# Patient Record
Sex: FEMALE | Race: WHITE | URBAN - METROPOLITAN AREA
[De-identification: names, ages, dates, MRNs, and addresses within clinical notes are randomized per-mention and may not be internally consistent; named-entity substitution may affect disease eponyms.]

---

## 2021-03-03 DIAGNOSIS — Z23 NEED FOR VACCINATION: ICD-10-CM

## 2024-08-29 ENCOUNTER — HOSPITAL ENCOUNTER (EMERGENCY)
Facility: MEDICAL CENTER | Age: 74
End: 2024-08-29
Attending: EMERGENCY MEDICINE

## 2024-08-29 VITALS
OXYGEN SATURATION: 95 % | HEIGHT: 63 IN | TEMPERATURE: 98.8 F | RESPIRATION RATE: 15 BRPM | WEIGHT: 225 LBS | HEART RATE: 98 BPM | DIASTOLIC BLOOD PRESSURE: 74 MMHG | BODY MASS INDEX: 39.87 KG/M2 | SYSTOLIC BLOOD PRESSURE: 166 MMHG

## 2024-08-29 DIAGNOSIS — E86.0 DEHYDRATION: ICD-10-CM
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
## 2024-08-29 LAB
ALBUMIN SERPL BCP-MCNC: 3.7 G/DL (ref 3.2–4.9)
ALBUMIN/GLOB SERPL: 1.1 G/DL
ALP SERPL-CCNC: 87 U/L (ref 30–99)
ALT SERPL-CCNC: 36 U/L (ref 2–50)
ANION GAP SERPL CALC-SCNC: 20 MMOL/L (ref 7–16)
APPEARANCE UR: CLEAR
AST SERPL-CCNC: 52 U/L (ref 12–45)
BACTERIA #/AREA URNS HPF: NEGATIVE /HPF
BASOPHILS # BLD AUTO: 0.4 % (ref 0–1.8)
BASOPHILS # BLD: 0.03 K/UL (ref 0–0.12)
BILIRUB SERPL-MCNC: 1.2 MG/DL (ref 0.1–1.5)
BILIRUB UR QL STRIP.AUTO: NEGATIVE
BUN SERPL-MCNC: 31 MG/DL (ref 8–22)
CALCIUM ALBUM COR SERPL-MCNC: 9.5 MG/DL (ref 8.5–10.5)
CALCIUM SERPL-MCNC: 9.3 MG/DL (ref 8.5–10.5)
CAOX CRY #/AREA URNS HPF: NORMAL /HPF
CHLORIDE SERPL-SCNC: 106 MMOL/L (ref 96–112)
CK SERPL-CCNC: 297 U/L (ref 0–154)
CO2 SERPL-SCNC: 14 MMOL/L (ref 20–33)
COLOR UR: YELLOW
CREAT SERPL-MCNC: 0.88 MG/DL (ref 0.5–1.4)
EOSINOPHIL # BLD AUTO: 0.03 K/UL (ref 0–0.51)
EOSINOPHIL NFR BLD: 0.4 % (ref 0–6.9)
EPI CELLS #/AREA URNS HPF: NEGATIVE /HPF
ERYTHROCYTE [DISTWIDTH] IN BLOOD BY AUTOMATED COUNT: 43.5 FL (ref 35.9–50)
GFR SERPLBLD CREATININE-BSD FMLA CKD-EPI: 69 ML/MIN/1.73 M 2
GLOBULIN SER CALC-MCNC: 3.5 G/DL (ref 1.9–3.5)
GLUCOSE SERPL-MCNC: 108 MG/DL (ref 65–99)
GLUCOSE UR STRIP.AUTO-MCNC: NEGATIVE MG/DL
HCT VFR BLD AUTO: 37.6 % (ref 37–47)
HGB BLD-MCNC: 12.3 G/DL (ref 12–16)
HYALINE CASTS #/AREA URNS LPF: NORMAL /LPF
IMM GRANULOCYTES # BLD AUTO: 0.02 K/UL (ref 0–0.11)
IMM GRANULOCYTES NFR BLD AUTO: 0.3 % (ref 0–0.9)
KETONES UR STRIP.AUTO-MCNC: >=160 MG/DL
LEUKOCYTE ESTERASE UR QL STRIP.AUTO: NEGATIVE
LYMPHOCYTES # BLD AUTO: 0.85 K/UL (ref 1–4.8)
LYMPHOCYTES NFR BLD: 11.4 % (ref 22–41)
MAGNESIUM SERPL-MCNC: 2 MG/DL (ref 1.5–2.5)
MCH RBC QN AUTO: 28.4 PG (ref 27–33)
MCHC RBC AUTO-ENTMCNC: 32.7 G/DL (ref 32.2–35.5)
MCV RBC AUTO: 86.8 FL (ref 81.4–97.8)
MICRO URNS: ABNORMAL
MONOCYTES # BLD AUTO: 1.08 K/UL (ref 0–0.85)
MONOCYTES NFR BLD AUTO: 14.5 % (ref 0–13.4)
MUCOUS THREADS #/AREA URNS HPF: NORMAL /HPF
NEUTROPHILS # BLD AUTO: 5.43 K/UL (ref 1.82–7.42)
NEUTROPHILS NFR BLD: 73 % (ref 44–72)
NITRITE UR QL STRIP.AUTO: NEGATIVE
NRBC # BLD AUTO: 0 K/UL
NRBC BLD-RTO: 0 /100 WBC (ref 0–0.2)
PH UR STRIP.AUTO: 5.5 [PH] (ref 5–8)
PLATELET # BLD AUTO: 267 K/UL (ref 164–446)
PMV BLD AUTO: 11.3 FL (ref 9–12.9)
POTASSIUM SERPL-SCNC: 3.8 MMOL/L (ref 3.6–5.5)
PROT SERPL-MCNC: 7.2 G/DL (ref 6–8.2)
PROT UR QL STRIP: 30 MG/DL
RBC # BLD AUTO: 4.33 M/UL (ref 4.2–5.4)
RBC # URNS HPF: NORMAL /HPF
RBC UR QL AUTO: NEGATIVE
SODIUM SERPL-SCNC: 140 MMOL/L (ref 135–145)
SP GR UR STRIP.AUTO: 1.03
TSH SERPL-ACNC: 2.41 UIU/ML (ref 0.35–5.5)
UROBILINOGEN UR STRIP.AUTO-MCNC: 1 MG/DL
WBC # BLD AUTO: 7.4 K/UL (ref 4.8–10.8)
WBC #/AREA URNS HPF: NORMAL /HPF

## 2024-08-29 PROCEDURE — 80053 COMPREHEN METABOLIC PANEL: CPT

## 2024-08-29 PROCEDURE — 700105 HCHG RX REV CODE 258: Performed by: EMERGENCY MEDICINE

## 2024-08-29 PROCEDURE — 36415 COLL VENOUS BLD VENIPUNCTURE: CPT

## 2024-08-29 PROCEDURE — 84443 ASSAY THYROID STIM HORMONE: CPT

## 2024-08-29 PROCEDURE — 83735 ASSAY OF MAGNESIUM: CPT

## 2024-08-29 PROCEDURE — 81001 URINALYSIS AUTO W/SCOPE: CPT

## 2024-08-29 PROCEDURE — 82550 ASSAY OF CK (CPK): CPT

## 2024-08-29 PROCEDURE — 99284 EMERGENCY DEPT VISIT MOD MDM: CPT

## 2024-08-29 PROCEDURE — 85025 COMPLETE CBC W/AUTO DIFF WBC: CPT

## 2024-08-29 RX ORDER — SODIUM CHLORIDE, SODIUM LACTATE, POTASSIUM CHLORIDE, CALCIUM CHLORIDE 600; 310; 30; 20 MG/100ML; MG/100ML; MG/100ML; MG/100ML
1000 INJECTION, SOLUTION INTRAVENOUS ONCE
Status: COMPLETED | OUTPATIENT
Start: 2024-08-29 | End: 2024-08-29

## 2024-08-29 RX ORDER — SODIUM CHLORIDE 9 MG/ML
1000 INJECTION, SOLUTION INTRAVENOUS ONCE
Status: COMPLETED | OUTPATIENT
Start: 2024-08-29 | End: 2024-08-29

## 2024-08-29 RX ADMIN — SODIUM CHLORIDE, POTASSIUM CHLORIDE, SODIUM LACTATE AND CALCIUM CHLORIDE 1000 ML: 600; 310; 30; 20 INJECTION, SOLUTION INTRAVENOUS at 17:04

## 2024-08-29 RX ADMIN — SODIUM CHLORIDE 1000 ML: 9 INJECTION, SOLUTION INTRAVENOUS at 19:23

## 2024-08-29 NOTE — ED TRIAGE NOTES
Chief Complaint   Patient presents with    T-5000 FALL     [facility] from home. Pt fell on Saturday at home and has been unable to get up since. Pt was able to get to small amount of food and water while on the floor.   -[facility] -[facility]   Denies any current pain     150 NS from EMS

## 2024-08-29 NOTE — ED PROVIDER NOTES
ED Provider Note    Scribed for Gregorio Ramos M.D. by Sissy Solorzano. 8/29/2024  4:25 PM    Primary care provider: No primary care provider noted.   Means of arrival: EMS    CHIEF COMPLAINT  Chief Complaint   Patient presents with    T-5000 FALL     [facility] from home. Pt fell on Saturday at home and has been unable to get up since. Pt was able to get to small amount of food and water while on the floor.   -[facility] -[facility]   Denies any current pain       EXTERNAL RECORDS REVIEWED  No old notes.     JUAN Goldberg is a 74 y.o. female who presents to the Emergency Department via EMS for a T-5000 fall onset approximately 5 days ago. The patient states that just before midnight on Saturday when she started having lower back pain. She went to sit and states that she could not get up quick enough and went down to the ground and could not get back up. She notes that the fall was a semi-controlled. She notes that EMS found her because her sister called them. She adds that she was sitting on her buttocks and denies any injuries. The patient also reports that she had some food and water on the floor. The patient has some bruises and scraps from trying to get up. Denies any abdominal pain, fever, nausea, vomiting, or diarrhea. She notes that she had some burning with urination due to how she was sitting. Denies taking any blood thinners. Patient denies walking with a cane or walker. Denies any known medical problems.     LIMITATION TO HISTORY   None    OUTSIDE HISTORIAN(S):  None    REVIEW OF SYSTEMS  Pertinent positives include: bruising and scrapes on her arms  Pertinent negatives include: abdominal pain, fever, nausea, vomiting, or diarrhea.      SOCIAL HISTORY  Social History     Tobacco Use    Smoking status: Never    Smokeless tobacco: Never   Vaping Use    Vaping status: Never Used   Substance Use Topics    Alcohol use: Never    Drug use: Never     Social History     Substance and Sexual Activity   Drug Use  "Never     PHYSICAL EXAM  VITAL SIGNS: BP (!) 179/83   Pulse (!) 125   Temp 37.1 °C (98.8 °F) (Temporal)   Resp 17   Ht 1.6 m (5' 3\")   Wt 102 kg (225 lb)   SpO2 95%   BMI 39.86 kg/m²   Reviewed and hypertensive tachycardic afebrile  Constitutional: Well developed, Well nourished, Elevated BMI.  HENT: Normocephalic, atraumatic, bilateral external ears normal, No intraoral erythema, edema, exudate  Eyes: PERRLA, conjunctiva pink, no scleral icterus.   Cardiovascular: Tachycardic, Regular rhythm. No murmurs, rubs or gallops.  No dependent edema or calf tenderness  Respiratory: Lungs clear to auscultation bilaterally. No wheezes, rales, or rhonchi.  Abdominal:  Abdomen soft, non-tender, non distended. No rebound, or guarding.    Skin: Bruising on bilateral legs, No erythema, no rash. No wounds.   Genitourinary: No costovertebral angle tenderness.   Musculoskeletal: no deformities.   Neurologic: Alert & oriented x 3, cranial nerves 2-12 intact by passive exam.  No focal deficit noted.  Psychiatric: Affect normal, Judgment normal, Mood normal.     LABS Ordered and Reviewed by Me:  Results for orders placed or performed during the hospital encounter of 08/29/24   CBC WITH DIFFERENTIAL   Result Value Ref Range    WBC 7.4 4.8 - 10.8 K/uL    RBC 4.33 4.20 - 5.40 M/uL    Hemoglobin 12.3 12.0 - 16.0 g/dL    Hematocrit 37.6 37.0 - 47.0 %    MCV 86.8 81.4 - 97.8 fL    MCH 28.4 27.0 - 33.0 pg    MCHC 32.7 32.2 - 35.5 g/dL    RDW 43.5 35.9 - 50.0 fL    Platelet Count 267 164 - 446 K/uL    MPV 11.3 9.0 - 12.9 fL    Neutrophils-Polys 73.00 (H) 44.00 - 72.00 %    Lymphocytes 11.40 (L) 22.00 - 41.00 %    Monocytes 14.50 (H) 0.00 - 13.40 %    Eosinophils 0.40 0.00 - 6.90 %    Basophils 0.40 0.00 - 1.80 %    Immature Granulocytes 0.30 0.00 - 0.90 %    Nucleated RBC 0.00 0.00 - 0.20 /100 WBC    Neutrophils (Absolute) 5.43 1.82 - 7.42 K/uL    Lymphs (Absolute) 0.85 (L) 1.00 - 4.80 K/uL    Monos (Absolute) 1.08 (H) 0.00 - 0.85 K/uL "    Eos (Absolute) 0.03 0.00 - 0.51 K/uL    Baso (Absolute) 0.03 0.00 - 0.12 K/uL    Immature Granulocytes (abs) 0.02 0.00 - 0.11 K/uL    NRBC (Absolute) 0.00 K/uL   Comp Metabolic Panel   Result Value Ref Range    Sodium 140 135 - 145 mmol/L    Potassium 3.8 3.6 - 5.5 mmol/L    Chloride 106 96 - 112 mmol/L    Co2 14 (L) 20 - 33 mmol/L    Anion Gap 20.0 (H) 7.0 - 16.0    Glucose 108 (H) 65 - 99 mg/dL    Bun 31 (H) 8 - 22 mg/dL    Creatinine 0.88 0.50 - 1.40 mg/dL    Calcium 9.3 8.5 - 10.5 mg/dL    Correct Calcium 9.5 8.5 - 10.5 mg/dL    AST(SGOT) 52 (H) 12 - 45 U/L    ALT(SGPT) 36 2 - 50 U/L    Alkaline Phosphatase 87 30 - 99 U/L    Total Bilirubin 1.2 0.1 - 1.5 mg/dL    Albumin 3.7 3.2 - 4.9 g/dL    Total Protein 7.2 6.0 - 8.2 g/dL    Globulin 3.5 1.9 - 3.5 g/dL    A-G Ratio 1.1 g/dL   TSH   Result Value Ref Range    TSH 2.410 0.350 - 5.500 uIU/mL   URINALYSIS    Specimen: Urine   Result Value Ref Range    Color Yellow     Character Clear     Specific Gravity 1.026 <1.035    Ph 5.5 5.0 - 8.0    Glucose Negative Negative mg/dL    Ketones >=160 Negative mg/dL    Protein 30 (A) Negative mg/dL    Bilirubin Negative Negative    Urobilinogen, Urine 1.0 Negative    Nitrite Negative Negative    Leukocyte Esterase Negative Negative    Occult Blood Negative Negative    Micro Urine Req Microscopic    CREATINE KINASE   Result Value Ref Range    CPK Total 297 (H) 0 - 154 U/L   MAGNESIUM   Result Value Ref Range    Magnesium 2.0 1.5 - 2.5 mg/dL   ESTIMATED GFR   Result Value Ref Range    GFR (CKD-EPI) 69 >60 mL/min/1.73 m 2   URINE MICROSCOPIC (W/UA)   Result Value Ref Range    WBC 0-2 /hpf    RBC 0-2 /hpf    Bacteria Negative None /hpf    Epithelial Cells Negative /hpf    Mucous Threads Moderate /hpf    Ca Oxalate Crystal Few /hpf    Hyaline Cast 0-2 /lpf       COURSE & MEDICAL DECISION MAKING  4:25 PM - Patient seen and examined at bedside. Discussed the plan of care, including ordering labs to further evaluate. The patient  verbalizes their understanding and agreement to the plan of care. Ordered for CBC with diff, CMP, TSH, UA, creatine kinase to evaluate her symptoms.      6:13 PM - Ordered urine microscopic to further evaluate the patient's symptoms.     6:27 PM - Patient was reevaluated at bedside. Patient's heart rate has improved with IV fluids. Patient has eaten half of her meal and is still on her first bag. Discussed lab results with the patient. I informed her that we are still waiting for IV fluids. Patient verbalizes understanding and agreement to this plan of care.      INTERVENTIONS BY ME:  Medications   NS infusion 1,000 mL (1,000 mL Intravenous New Bag 8/29/24 1923)   LR Bolus (0 mL Intravenous Stopped 8/29/24 1917)       7:01 PM - On reassessment response to intervention tolerated PO meal.     ASSESSMENT, COURSE AND PLAN:  PROBLEMS EVALUATED THIS VISIT:    This patient presents with dehydration after being down on the ground for more than 36 hours.  There was no injury from fall.  The patient could get not get up due to general deconditioning.  She has significant dehydration as evidenced by tachycardia and acidosis but no evidence of rhabdo.  She has bruises and no evidence of decubiti.  There is no UTI.    Measures: IV fluids were ordered for dehydration.  Patient response to the fluids upon reevaluation patient had improved heart rate.     DISPOSITION AND DISCUSSIONS    I have discussed management of the patient with the following physicians and sources:    None    RISK:  Moderate due to no insurance and no PCP.     PLAN:    Increased fluid intake next 24 hours.     Dehydration handout given    Return for worsening weakness.     Followup:  [facility] [place] [id number] [facility] [address]  [facility] [address]  [phone]  Schedule an appointment as soon as possible for a visit   with a new primary    CONDITION: Stable and improved.     FINAL IMPRESSION  1. Dehydration    2. Fall, initial encounter          I,  Sissy Solorzano (Scribe), am scribing for, and in the presence of, Gregorio Ramos M.D..    Electronically signed by: Sissy Solorzano (Biankaibnatasha), 8/29/2024    I, Gregorio Ramos M.D. personally performed the services described in this documentation, as scribed by Sissy Solorzano in my presence, and it is both accurate and complete.    The note accurately reflects work and decisions made by me.  Gregorio Ramos M.D.  8/30/2024  1:52 PM

## 2024-08-30 NOTE — ED NOTES
Patient able to ambulate without difficulty, denies dizziness or weakness, patient in the chair at this time and wants to go back to bed later. Patient calm and cooperative,       Spoke with Kristin (patient's sitser). ETA at 2140 as per patient's sister.    ERP informed

## 2024-08-30 NOTE — DISCHARGE INSTRUCTIONS
Increase your fluid intake for the next 24 hours.  Schedule an appointment with a new [facility] at [facility].  Return to the ER for weakness or other concerning symptoms.

## 2024-08-30 NOTE — ED NOTES
Urine collected via straight cath and sent to lab. Soiled clothing removed and placed in gown. Pressure ulcer noted to buttock. Image placed in media.

## 2024-08-30 NOTE — ED NOTES
Break RN:    Pt discharged to home. Discharge paperwork provided. Education provided by ERP. Reinforced discharge instructions.  Pt was given follow up instructions   Pt verbalized understanding of all instructions for discharge.   Patient went out of the ER via WC., alert and oriented x 4, with all belongings.     Family to drive pt home

## 2024-08-30 NOTE — ED NOTES
Bedside report received from off going RN Crystal, assumed care of patient.  POC discussed with patient. Call light within reach, all needs addressed at this time.       Fall risk interventions in place: Move the patient closer to the nurse's station, Patient's personal possessions are with in their safe reach, Place socks on patient, Place fall risk sign on patient's door, Keep floor surfaces clean and dry, and Accompanied to restroom (all applicable per [place] Fall risk assessment)   Continuous monitoring: Cardiac Leads, Pulse Ox, or Blood Pressure  IVF/IV medications: Infusion per MAR (List Med(s)) NS 1L bolus  Oxygen: Room Air  Bedside sitter: Not Applicable   Isolation: Not Applicable    Patient awake in bed, Aox4, denies any complaint, not in respiratory distress

## 2024-08-30 NOTE — PROGRESS NOTES
"ED Observation Progress Note    Scribed for Gregorio Ramos M.d. by Sissy Sloorzano. 8/29/2024  6:28 PM    Date of Service: 08/29/24    Interval History and Interventions  5:20 PM - Patient was reevaluated at bedside. Patient will be kept for neuro consult tomorrow.     Physical Exam  /61   Pulse (!) 110   Temp 37.1 °C (98.8 °F) (Temporal)   Resp 15   Ht 1.6 m (5' 3\")   Wt 102 kg (225 lb)   SpO2 95%   BMI 39.86 kg/m² .    Constitutional: Awake and alert. Nontoxic  HENT:  Grossly normal  Eyes: Grossly normal  Neck: Normal range of motion  Cardiovascular: Normal heart rate   Thorax & Lungs: No respiratory distress  Abdomen: Nontender  Skin:  No pathologic rash.   Extremities: Well perfused  Psychiatric: Affect normal    Labs  Results for orders placed or performed during the hospital encounter of 08/29/24   CBC WITH DIFFERENTIAL   Result Value Ref Range    WBC 7.4 4.8 - 10.8 K/uL    RBC 4.33 4.20 - 5.40 M/uL    Hemoglobin 12.3 12.0 - 16.0 g/dL    Hematocrit 37.6 37.0 - 47.0 %    MCV 86.8 81.4 - 97.8 fL    MCH 28.4 27.0 - 33.0 pg    MCHC 32.7 32.2 - 35.5 g/dL    RDW 43.5 35.9 - 50.0 fL    Platelet Count 267 164 - 446 K/uL    MPV 11.3 9.0 - 12.9 fL    Neutrophils-Polys 73.00 (H) 44.00 - 72.00 %    Lymphocytes 11.40 (L) 22.00 - 41.00 %    Monocytes 14.50 (H) 0.00 - 13.40 %    Eosinophils 0.40 0.00 - 6.90 %    Basophils 0.40 0.00 - 1.80 %    Immature Granulocytes 0.30 0.00 - 0.90 %    Nucleated RBC 0.00 0.00 - 0.20 /100 WBC    Neutrophils (Absolute) 5.43 1.82 - 7.42 K/uL    Lymphs (Absolute) 0.85 (L) 1.00 - 4.80 K/uL    Monos (Absolute) 1.08 (H) 0.00 - 0.85 K/uL    Eos (Absolute) 0.03 0.00 - 0.51 K/uL    Baso (Absolute) 0.03 0.00 - 0.12 K/uL    Immature Granulocytes (abs) 0.02 0.00 - 0.11 K/uL    NRBC (Absolute) 0.00 K/uL   Comp Metabolic Panel   Result Value Ref Range    Sodium 140 135 - 145 mmol/L    Potassium 3.8 3.6 - 5.5 mmol/L    Chloride 106 96 - 112 mmol/L    Co2 14 (L) 20 - 33 mmol/L    Anion Gap 20.0 " (H) 7.0 - 16.0    Glucose 108 (H) 65 - 99 mg/dL    Bun 31 (H) 8 - 22 mg/dL    Creatinine 0.88 0.50 - 1.40 mg/dL    Calcium 9.3 8.5 - 10.5 mg/dL    Correct Calcium 9.5 8.5 - 10.5 mg/dL    AST(SGOT) 52 (H) 12 - 45 U/L    ALT(SGPT) 36 2 - 50 U/L    Alkaline Phosphatase 87 30 - 99 U/L    Total Bilirubin 1.2 0.1 - 1.5 mg/dL    Albumin 3.7 3.2 - 4.9 g/dL    Total Protein 7.2 6.0 - 8.2 g/dL    Globulin 3.5 1.9 - 3.5 g/dL    A-G Ratio 1.1 g/dL   TSH   Result Value Ref Range    TSH 2.410 0.350 - 5.500 uIU/mL   CREATINE KINASE   Result Value Ref Range    CPK Total 297 (H) 0 - 154 U/L   MAGNESIUM   Result Value Ref Range    Magnesium 2.0 1.5 - 2.5 mg/dL   ESTIMATED GFR   Result Value Ref Range    GFR (CKD-EPI) 69 >60 mL/min/1.73 m 2       Problem List  1. ***     ISissy (Scribe), am scribing for, and in the presence of, Gregorio Ramos M.D..    Electronically signed by: Sissy Solorzano (Scribe), 8/29/2024    Gregorio OCAMPO M.D. personally performed the services described in this documentation, as scribed by Sissy Solorzano in my presence, and it is both accurate and complete.     {ERP Attestation (ERP ONLY):200109}